# Patient Record
Sex: MALE | Race: WHITE | Employment: FULL TIME | URBAN - METROPOLITAN AREA
[De-identification: names, ages, dates, MRNs, and addresses within clinical notes are randomized per-mention and may not be internally consistent; named-entity substitution may affect disease eponyms.]

---

## 2017-07-21 ENCOUNTER — OFFICE VISIT (OUTPATIENT)
Dept: FAMILY MEDICINE CLINIC | Age: 52
End: 2017-07-21

## 2017-07-21 VITALS
HEART RATE: 64 BPM | BODY MASS INDEX: 25.24 KG/M2 | DIASTOLIC BLOOD PRESSURE: 80 MMHG | OXYGEN SATURATION: 97 % | HEIGHT: 75 IN | WEIGHT: 203 LBS | SYSTOLIC BLOOD PRESSURE: 108 MMHG | RESPIRATION RATE: 14 BRPM

## 2017-07-21 DIAGNOSIS — J45.20 MILD INTERMITTENT ASTHMA WITHOUT COMPLICATION: ICD-10-CM

## 2017-07-21 DIAGNOSIS — J30.9 ALLERGIC RHINITIS, UNSPECIFIED ALLERGIC RHINITIS TRIGGER, UNSPECIFIED RHINITIS SEASONALITY: Primary | ICD-10-CM

## 2017-07-21 DIAGNOSIS — R42 DIZZINESS: ICD-10-CM

## 2017-07-21 PROCEDURE — 99203 OFFICE O/P NEW LOW 30 MIN: CPT | Performed by: FAMILY MEDICINE

## 2017-07-21 RX ORDER — SUMATRIPTAN 25 MG/1
25 TABLET, FILM COATED ORAL
COMMUNITY

## 2017-07-21 RX ORDER — TOPIRAMATE 25 MG/1
25 TABLET ORAL DAILY
COMMUNITY

## 2017-07-21 RX ORDER — CETIRIZINE HYDROCHLORIDE 10 MG/1
10 TABLET ORAL 2 TIMES DAILY
COMMUNITY

## 2017-07-21 RX ORDER — MECLIZINE HYDROCHLORIDE 25 MG/1
25 TABLET ORAL
COMMUNITY
End: 2018-06-14 | Stop reason: SDUPTHER

## 2017-07-21 RX ORDER — ALBUTEROL SULFATE 90 UG/1
2 AEROSOL, METERED RESPIRATORY (INHALATION) EVERY 6 HOURS PRN
COMMUNITY
End: 2017-12-05 | Stop reason: SDUPTHER

## 2017-07-21 RX ORDER — MULTIVIT-MIN/IRON/FOLIC ACID/K 18-600-40
2000 CAPSULE ORAL DAILY
COMMUNITY

## 2017-07-21 ASSESSMENT — ENCOUNTER SYMPTOMS
PHOTOPHOBIA: 0
ABDOMINAL PAIN: 0
EYE ITCHING: 1
NAUSEA: 0
VOMITING: 0

## 2017-07-21 ASSESSMENT — PATIENT HEALTH QUESTIONNAIRE - PHQ9
1. LITTLE INTEREST OR PLEASURE IN DOING THINGS: 0
SUM OF ALL RESPONSES TO PHQ9 QUESTIONS 1 & 2: 0
SUM OF ALL RESPONSES TO PHQ QUESTIONS 1-9: 0
2. FEELING DOWN, DEPRESSED OR HOPELESS: 0

## 2017-07-25 DIAGNOSIS — R42 DIZZINESS: ICD-10-CM

## 2017-07-25 LAB
A/G RATIO: 2.7 (ref 1.1–2.2)
ALBUMIN SERPL-MCNC: 4.9 G/DL (ref 3.4–5)
ALP BLD-CCNC: 55 U/L (ref 40–129)
ALT SERPL-CCNC: 25 U/L (ref 10–40)
ANION GAP SERPL CALCULATED.3IONS-SCNC: 13 MMOL/L (ref 3–16)
AST SERPL-CCNC: 20 U/L (ref 15–37)
BASOPHILS ABSOLUTE: 0 K/UL (ref 0–0.2)
BASOPHILS RELATIVE PERCENT: 0.8 %
BILIRUB SERPL-MCNC: 0.4 MG/DL (ref 0–1)
BUN BLDV-MCNC: 20 MG/DL (ref 7–20)
CALCIUM SERPL-MCNC: 9.4 MG/DL (ref 8.3–10.6)
CHLORIDE BLD-SCNC: 108 MMOL/L (ref 99–110)
CHOLESTEROL, TOTAL: 109 MG/DL (ref 0–199)
CO2: 22 MMOL/L (ref 21–32)
CREAT SERPL-MCNC: 1.5 MG/DL (ref 0.9–1.3)
EOSINOPHILS ABSOLUTE: 0.1 K/UL (ref 0–0.6)
EOSINOPHILS RELATIVE PERCENT: 1.5 %
GFR AFRICAN AMERICAN: 60
GFR NON-AFRICAN AMERICAN: 50
GLOBULIN: 1.8 G/DL
GLUCOSE BLD-MCNC: 96 MG/DL (ref 70–99)
HCT VFR BLD CALC: 43 % (ref 40.5–52.5)
HDLC SERPL-MCNC: 47 MG/DL (ref 40–60)
HEMOGLOBIN: 14.5 G/DL (ref 13.5–17.5)
LDL CHOLESTEROL CALCULATED: 52 MG/DL
LYMPHOCYTES ABSOLUTE: 1.5 K/UL (ref 1–5.1)
LYMPHOCYTES RELATIVE PERCENT: 42.5 %
MAGNESIUM: 2.3 MG/DL (ref 1.8–2.4)
MCH RBC QN AUTO: 32.3 PG (ref 26–34)
MCHC RBC AUTO-ENTMCNC: 33.6 G/DL (ref 31–36)
MCV RBC AUTO: 96 FL (ref 80–100)
MONOCYTES ABSOLUTE: 0.3 K/UL (ref 0–1.3)
MONOCYTES RELATIVE PERCENT: 9 %
NEUTROPHILS ABSOLUTE: 1.7 K/UL (ref 1.7–7.7)
NEUTROPHILS RELATIVE PERCENT: 46.2 %
PDW BLD-RTO: 13.5 % (ref 12.4–15.4)
PLATELET # BLD: 229 K/UL (ref 135–450)
PMV BLD AUTO: 7.8 FL (ref 5–10.5)
POTASSIUM SERPL-SCNC: 4.5 MMOL/L (ref 3.5–5.1)
RBC # BLD: 4.48 M/UL (ref 4.2–5.9)
SODIUM BLD-SCNC: 143 MMOL/L (ref 136–145)
T3 FREE: 2.7 PG/ML (ref 2.3–4.2)
T4 FREE: 1.3 NG/DL (ref 0.9–1.8)
TOTAL PROTEIN: 6.7 G/DL (ref 6.4–8.2)
TRIGL SERPL-MCNC: 49 MG/DL (ref 0–150)
TSH SERPL DL<=0.05 MIU/L-ACNC: 2.07 UIU/ML (ref 0.27–4.2)
VITAMIN D 25-HYDROXY: 36.7 NG/ML
VLDLC SERPL CALC-MCNC: 10 MG/DL
WBC # BLD: 3.6 K/UL (ref 4–11)

## 2017-08-02 PROBLEM — G89.29 CHRONIC BACK PAIN: Status: ACTIVE | Noted: 2017-08-02

## 2017-08-02 PROBLEM — M54.9 CHRONIC BACK PAIN: Status: ACTIVE | Noted: 2017-08-02

## 2017-08-02 PROBLEM — J45.909 ASTHMA: Status: ACTIVE | Noted: 2017-08-02

## 2017-08-02 PROBLEM — R51.9 HEADACHE: Status: ACTIVE | Noted: 2017-08-02

## 2017-08-11 ENCOUNTER — OFFICE VISIT (OUTPATIENT)
Dept: FAMILY MEDICINE CLINIC | Age: 52
End: 2017-08-11

## 2017-08-11 VITALS
RESPIRATION RATE: 14 BRPM | OXYGEN SATURATION: 97 % | DIASTOLIC BLOOD PRESSURE: 82 MMHG | SYSTOLIC BLOOD PRESSURE: 122 MMHG | WEIGHT: 204 LBS | BODY MASS INDEX: 25.36 KG/M2 | HEIGHT: 75 IN | HEART RATE: 78 BPM

## 2017-08-11 DIAGNOSIS — H55.00 NYSTAGMUS: ICD-10-CM

## 2017-08-11 DIAGNOSIS — Z23 NEED FOR PROPHYLACTIC VACCINATION AGAINST DIPHTHERIA-TETANUS-PERTUSSIS (DTP): ICD-10-CM

## 2017-08-11 DIAGNOSIS — M54.50 CHRONIC RIGHT-SIDED LOW BACK PAIN WITHOUT SCIATICA: ICD-10-CM

## 2017-08-11 DIAGNOSIS — G89.29 CHRONIC RIGHT-SIDED LOW BACK PAIN WITHOUT SCIATICA: ICD-10-CM

## 2017-08-11 DIAGNOSIS — R42 DIZZINESS: Primary | ICD-10-CM

## 2017-08-11 PROCEDURE — 90471 IMMUNIZATION ADMIN: CPT | Performed by: FAMILY MEDICINE

## 2017-08-11 PROCEDURE — 99213 OFFICE O/P EST LOW 20 MIN: CPT | Performed by: FAMILY MEDICINE

## 2017-08-11 PROCEDURE — 90715 TDAP VACCINE 7 YRS/> IM: CPT | Performed by: FAMILY MEDICINE

## 2017-08-11 ASSESSMENT — ENCOUNTER SYMPTOMS
VOMITING: 0
NAUSEA: 0
ABDOMINAL PAIN: 0

## 2017-08-29 ENCOUNTER — OFFICE VISIT (OUTPATIENT)
Dept: ORTHOPEDIC SURGERY | Age: 52
End: 2017-08-29

## 2017-08-29 VITALS
BODY MASS INDEX: 26.05 KG/M2 | HEIGHT: 74 IN | SYSTOLIC BLOOD PRESSURE: 136 MMHG | DIASTOLIC BLOOD PRESSURE: 85 MMHG | WEIGHT: 203 LBS

## 2017-08-29 DIAGNOSIS — M47.816 SPONDYLOSIS OF LUMBAR REGION WITHOUT MYELOPATHY OR RADICULOPATHY: ICD-10-CM

## 2017-08-29 DIAGNOSIS — M54.50 PAIN OF LUMBAR SPINE: ICD-10-CM

## 2017-08-29 DIAGNOSIS — M51.36 DDD (DEGENERATIVE DISC DISEASE), LUMBAR: ICD-10-CM

## 2017-08-29 DIAGNOSIS — M51.26 HNP (HERNIATED NUCLEUS PULPOSUS), LUMBAR: Primary | ICD-10-CM

## 2017-08-29 PROCEDURE — 72100 X-RAY EXAM L-S SPINE 2/3 VWS: CPT | Performed by: PHYSICAL MEDICINE & REHABILITATION

## 2017-08-29 PROCEDURE — 99243 OFF/OP CNSLTJ NEW/EST LOW 30: CPT | Performed by: PHYSICAL MEDICINE & REHABILITATION

## 2017-09-01 ENCOUNTER — OFFICE VISIT (OUTPATIENT)
Dept: FAMILY MEDICINE CLINIC | Age: 52
End: 2017-09-01

## 2017-09-01 VITALS
BODY MASS INDEX: 24.99 KG/M2 | HEART RATE: 78 BPM | HEIGHT: 75 IN | RESPIRATION RATE: 14 BRPM | SYSTOLIC BLOOD PRESSURE: 128 MMHG | DIASTOLIC BLOOD PRESSURE: 88 MMHG | OXYGEN SATURATION: 98 % | WEIGHT: 201 LBS

## 2017-09-01 DIAGNOSIS — R42 DIZZINESS: Primary | ICD-10-CM

## 2017-09-01 PROCEDURE — 99213 OFFICE O/P EST LOW 20 MIN: CPT | Performed by: FAMILY MEDICINE

## 2017-09-01 ASSESSMENT — ENCOUNTER SYMPTOMS
ABDOMINAL PAIN: 0
VOMITING: 0
NAUSEA: 0

## 2017-09-12 ENCOUNTER — OFFICE VISIT (OUTPATIENT)
Dept: ORTHOPEDIC SURGERY | Age: 52
End: 2017-09-12

## 2017-09-12 VITALS
SYSTOLIC BLOOD PRESSURE: 136 MMHG | WEIGHT: 201.06 LBS | HEIGHT: 75 IN | BODY MASS INDEX: 25 KG/M2 | DIASTOLIC BLOOD PRESSURE: 85 MMHG

## 2017-09-12 DIAGNOSIS — M51.26 LUMBAR DISC DISPLACEMENT WITHOUT MYELOPATHY: Primary | ICD-10-CM

## 2017-09-12 DIAGNOSIS — M43.16 SPONDYLOLISTHESIS OF LUMBAR REGION: ICD-10-CM

## 2017-09-12 PROCEDURE — 99212 OFFICE O/P EST SF 10 MIN: CPT | Performed by: PHYSICAL MEDICINE & REHABILITATION

## 2017-10-02 ENCOUNTER — OFFICE VISIT (OUTPATIENT)
Dept: ENT CLINIC | Age: 52
End: 2017-10-02

## 2017-10-02 VITALS
SYSTOLIC BLOOD PRESSURE: 131 MMHG | WEIGHT: 206 LBS | DIASTOLIC BLOOD PRESSURE: 89 MMHG | BODY MASS INDEX: 26.44 KG/M2 | HEIGHT: 74 IN

## 2017-10-02 DIAGNOSIS — H81.319 VERTIGO, AURAL, UNSPECIFIED LATERALITY: Primary | ICD-10-CM

## 2017-10-02 DIAGNOSIS — H81.09 ENDOLYMPHATIC HYDROPS, UNSPECIFIED LATERALITY: ICD-10-CM

## 2017-10-02 PROCEDURE — 99243 OFF/OP CNSLTJ NEW/EST LOW 30: CPT | Performed by: OTOLARYNGOLOGY

## 2017-10-02 RX ORDER — TRIAMTERENE AND HYDROCHLOROTHIAZIDE 37.5; 25 MG/1; MG/1
1 TABLET ORAL DAILY
Qty: 30 TABLET | Refills: 3 | Status: SHIPPED | OUTPATIENT
Start: 2017-10-02

## 2017-10-02 NOTE — PROGRESS NOTES
CHIEF COMPLAINT: Vertigo    HISTORY OF PRESENT ILLNESS:  54 y.o. male referred for consultation who presents with 4 years of vertigo. True vertigo. Gets attacks. Occurs irregularly. Lasts 1-3 days. No nausea. No change in hearing. Has tinnitus both ears which preceded vertigo. No ear fullness. No aggravating or alleviating factors. PAST MEDICAL HISTORY:   History   Smoking Status    Former Smoker   Smokeless Tobacco    Current User    Types: Chew                                                    History   Alcohol Use    Yes     Comment: Occassionally                                                    Current Outpatient Prescriptions:     fluticasone-salmeterol (ADVAIR) 250-50 MCG/DOSE AEPB, Inhale 1 puff into the lungs daily, Disp: , Rfl:     topiramate (TOPAMAX) 25 MG tablet, Take 25 mg by mouth daily, Disp: , Rfl:     cetirizine (ZYRTEC) 10 MG tablet, Take 10 mg by mouth 2 times daily, Disp: , Rfl:     SUMAtriptan (IMITREX) 25 MG tablet, Take 25 mg by mouth once as needed for Migraine (PrN), Disp: , Rfl:     meclizine (ANTIVERT) 25 MG tablet, Take 25 mg by mouth once as needed (Prn as needed), Disp: , Rfl:     Cholecalciferol (VITAMIN D) 2000 units CAPS capsule, Take 2,000 Units by mouth daily, Disp: , Rfl:     albuterol sulfate  (90 Base) MCG/ACT inhaler, Inhale 2 puffs into the lungs every 6 hours as needed for Wheezing (Prn), Disp: , Rfl:                                                  Past Medical History:   Diagnosis Date    Allergic rhinitis     Asthma     Chronic back pain     Headache     Osteoarthritis                                                     Past Surgical History:   Procedure Laterality Date    COLONOSCOPY      ELBOW SURGERY Right 07/05/2014    SHOULDER SURGERY Right 08/2015    VASECTOMY           REVIEW OF SYSTEMS:  All pertinent positive and negative review of systems included in HPI. Otherwise, all systems are reviewed and negative.     PHYSICAL

## 2017-12-05 ENCOUNTER — OFFICE VISIT (OUTPATIENT)
Dept: FAMILY MEDICINE CLINIC | Age: 52
End: 2017-12-05

## 2017-12-05 VITALS
HEIGHT: 75 IN | OXYGEN SATURATION: 96 % | SYSTOLIC BLOOD PRESSURE: 128 MMHG | HEART RATE: 112 BPM | WEIGHT: 209 LBS | DIASTOLIC BLOOD PRESSURE: 80 MMHG | BODY MASS INDEX: 25.99 KG/M2 | RESPIRATION RATE: 14 BRPM

## 2017-12-05 DIAGNOSIS — J06.9 BACTERIAL URI: Primary | ICD-10-CM

## 2017-12-05 DIAGNOSIS — B96.89 BACTERIAL URI: Primary | ICD-10-CM

## 2017-12-05 PROCEDURE — 99213 OFFICE O/P EST LOW 20 MIN: CPT | Performed by: FAMILY MEDICINE

## 2017-12-05 RX ORDER — AZITHROMYCIN 250 MG/1
250 TABLET, FILM COATED ORAL DAILY
Qty: 1 PACKET | Refills: 0 | Status: SHIPPED | OUTPATIENT
Start: 2017-12-05 | End: 2018-02-28 | Stop reason: ALTCHOICE

## 2017-12-05 RX ORDER — ALBUTEROL SULFATE 90 UG/1
2 AEROSOL, METERED RESPIRATORY (INHALATION) EVERY 6 HOURS PRN
Qty: 1 INHALER | Refills: 2 | Status: SHIPPED | OUTPATIENT
Start: 2017-12-05 | End: 2020-03-13

## 2017-12-05 RX ORDER — FLUTICASONE PROPIONATE 50 MCG
1 SPRAY, SUSPENSION (ML) NASAL DAILY
Qty: 1 BOTTLE | Refills: 0 | Status: CANCELLED | OUTPATIENT
Start: 2017-12-05

## 2017-12-05 RX ORDER — BENZONATATE 100 MG/1
100 CAPSULE ORAL 3 TIMES DAILY PRN
Qty: 30 CAPSULE | Refills: 1 | Status: SHIPPED | OUTPATIENT
Start: 2017-12-05 | End: 2017-12-15

## 2017-12-05 ASSESSMENT — ENCOUNTER SYMPTOMS
NAUSEA: 0
DIARRHEA: 0
COUGH: 1
VOMITING: 0
RHINORRHEA: 1
CONSTIPATION: 0
ABDOMINAL PAIN: 0
SORE THROAT: 0

## 2017-12-05 NOTE — PROGRESS NOTES
12/5/2017    This is a 50 y.o. male   Chief Complaint   Patient presents with    Dizziness     3 month follow up Vertigo has gone no symptoms for about a 6 weeks    Congestion     nasal drainage going on for about a week     Cough     productive  Using Mucinex and Sudafed sometimes   . HPI  Patient presents today for 3 month follow-up for his dizziness, As well as nasal congestion for the last week and a productive cough. Currently using Nasonex and Sudafed. Saw ENT who thought it might be fluid around inner ear, was prescribed a medicine that helped except that pt got body cramps. Has appointment with ENT tomorrow. Dizziness has resolved. Past Medical History:   Diagnosis Date    Allergic rhinitis     Asthma     Chronic back pain     Headache     Osteoarthritis        Past Surgical History:   Procedure Laterality Date    COLONOSCOPY      ELBOW SURGERY Right 07/05/2014    SHOULDER SURGERY Right 08/2015    VASECTOMY         Social History     Social History    Marital status:      Spouse name: N/A    Number of children: N/A    Years of education: N/A     Occupational History    Not on file. Social History Main Topics    Smoking status: Former Smoker    Smokeless tobacco: Former User     Types: Chew    Alcohol use Yes      Comment: Occassionally    Drug use: No    Sexual activity: Yes     Partners: Female     Other Topics Concern    Not on file     Social History Narrative    No narrative on file       History reviewed. No pertinent family history.     Current Outpatient Prescriptions   Medication Sig Dispense Refill    albuterol sulfate  (90 Base) MCG/ACT inhaler Inhale 2 puffs into the lungs every 6 hours as needed for Wheezing (Prn) 1 Inhaler 2    azithromycin (ZITHROMAX Z-LIZ) 250 MG tablet Take 1 tablet by mouth daily As directed on pack 1 packet 0    benzonatate (TESSALON PERLES) 100 MG capsule Take 1 capsule by mouth 3 times daily as needed for Cough 30 capsule 1    fluticasone-salmeterol (ADVAIR) 250-50 MCG/DOSE AEPB Inhale 1 puff into the lungs daily      cetirizine (ZYRTEC) 10 MG tablet Take 10 mg by mouth 2 times daily      SUMAtriptan (IMITREX) 25 MG tablet Take 25 mg by mouth once as needed for Migraine (PrN)      meclizine (ANTIVERT) 25 MG tablet Take 25 mg by mouth once as needed (Prn as needed)      Cholecalciferol (VITAMIN D) 2000 units CAPS capsule Take 2,000 Units by mouth daily      triamterene-hydrochlorothiazide (MAXZIDE-25) 37.5-25 MG per tablet Take 1 tablet by mouth daily 30 tablet 3    topiramate (TOPAMAX) 25 MG tablet Take 25 mg by mouth daily       No current facility-administered medications for this visit.         Immunization History   Administered Date(s) Administered    Tdap (Boostrix, Adacel) 08/11/2017       Allergies   Allergen Reactions    Sulfa Antibiotics Shortness Of Breath    Sulphadimidine [Sulfamethazine] Shortness Of Breath       Orders Only on 07/25/2017   Component Date Value Ref Range Status    TSH 07/25/2017 2.07  0.27 - 4.20 uIU/mL Final    T4 Free 07/25/2017 1.3  0.9 - 1.8 ng/dL Final    T3, Free 07/25/2017 2.7  2.3 - 4.2 pg/mL Final    Cholesterol, Total 07/25/2017 109  0 - 199 mg/dL Final    Triglycerides 07/25/2017 49  0 - 150 mg/dL Final    HDL 07/25/2017 47  40 - 60 mg/dL Final    LDL Calculated 07/25/2017 52  <100 mg/dL Final    VLDL CHOLESTEROL CALCULATED 07/25/2017 10  Not Established mg/dL Final    WBC 07/25/2017 3.6* 4.0 - 11.0 K/uL Final    RBC 07/25/2017 4.48  4.20 - 5.90 M/uL Final    Hemoglobin 07/25/2017 14.5  13.5 - 17.5 g/dL Final    Hematocrit 07/25/2017 43.0  40.5 - 52.5 % Final    MCV 07/25/2017 96.0  80.0 - 100.0 fL Final    MCH 07/25/2017 32.3  26.0 - 34.0 pg Final    MCHC 07/25/2017 33.6  31.0 - 36.0 g/dL Final    RDW 07/25/2017 13.5  12.4 - 15.4 % Final    Platelets 03/66/6056 229  135 - 450 K/uL Final    MPV 07/25/2017 7.8  5.0 - 10.5 fL Final    Neutrophils % 07/25/2017 46.2  % Final    Lymphocytes % 07/25/2017 42.5  % Final    Monocytes % 07/25/2017 9.0  % Final    Eosinophils % 07/25/2017 1.5  % Final    Basophils % 07/25/2017 0.8  % Final    Neutrophils # 07/25/2017 1.7  1.7 - 7.7 K/uL Final    Lymphocytes # 07/25/2017 1.5  1.0 - 5.1 K/uL Final    Monocytes # 07/25/2017 0.3  0.0 - 1.3 K/uL Final    Eosinophils # 07/25/2017 0.1  0.0 - 0.6 K/uL Final    Basophils # 07/25/2017 0.0  0.0 - 0.2 K/uL Final    Sodium 07/25/2017 143  136 - 145 mmol/L Final    Potassium 07/25/2017 4.5  3.5 - 5.1 mmol/L Final    Chloride 07/25/2017 108  99 - 110 mmol/L Final    CO2 07/25/2017 22  21 - 32 mmol/L Final    Anion Gap 07/25/2017 13  3 - 16 Final    Glucose 07/25/2017 96  70 - 99 mg/dL Final    BUN 07/25/2017 20  7 - 20 mg/dL Final    CREATININE 07/25/2017 1.5* 0.9 - 1.3 mg/dL Final    GFR Non- 07/25/2017 50* >60 Final    Comment: >60 mL/min/1.73m2 EGFR, calc. for ages 25 and older using the  MDRD formula (not corrected for weight), is valid for stable  renal function.  GFR  07/25/2017 60* >60 Final    Comment: Chronic Kidney Disease: less than 60 ml/min/1.73 sq.m. Kidney Failure: less than 15 ml/min/1.73 sq.m. Results valid for patients 18 years and older.  Calcium 07/25/2017 9.4  8.3 - 10.6 mg/dL Final    Total Protein 07/25/2017 6.7  6.4 - 8.2 g/dL Final    Alb 07/25/2017 4.9  3.4 - 5.0 g/dL Final    Albumin/Globulin Ratio 07/25/2017 2.7* 1.1 - 2.2 Final    Total Bilirubin 07/25/2017 0.4  0.0 - 1.0 mg/dL Final    Alkaline Phosphatase 07/25/2017 55  40 - 129 U/L Final    ALT 07/25/2017 25  10 - 40 U/L Final    AST 07/25/2017 20  15 - 37 U/L Final    Globulin 07/25/2017 1.8  g/dL Final    Vit D, 25-Hydroxy 07/25/2017 36.7  >=30 ng/mL Final    Comment: <=20 ng/mL. ........... Sammie Prows Deficient  21-29 ng/mL. ......... Sammie Prows Insufficient  >=30 ng/mL. ........ Sammie Prows Sufficient      Magnesium 07/25/2017 2.30  1.80 - 2.40 mg/dL Final       Review of Systems   Constitutional: Positive for fatigue. Negative for chills and fever. HENT: Positive for congestion, postnasal drip and rhinorrhea. Negative for ear pain, hearing loss and sore throat (intially but has resolved). Respiratory: Positive for cough (productive - creamy green). Gastrointestinal: Negative for abdominal pain, constipation, diarrhea, nausea and vomiting. Neurological: Negative for dizziness. /80 (Site: Left Arm, Position: Sitting, Cuff Size: Medium Adult)   Pulse 112   Resp 14   Ht 6' 3\" (1.905 m)   Wt 209 lb (94.8 kg)   SpO2 96%   BMI 26.12 kg/m²     Physical Exam   Constitutional: He is oriented to person, place, and time. He appears well-developed and well-nourished. HENT:   Head: Normocephalic and atraumatic. Right Ear: External ear normal.   Left Ear: External ear normal.   Bilateral turbinate edema and erythema, mod pharyngeal erythema   Eyes: EOM are normal. Pupils are equal, round, and reactive to light. Neck: Normal range of motion. Cardiovascular: Normal rate, regular rhythm and normal heart sounds. Pulmonary/Chest: Effort normal. He has no rales. Decreased breath sounds bilaterally   Abdominal: Soft. Bowel sounds are normal. There is no tenderness. Neurological: He is alert and oriented to person, place, and time. Skin: Skin is warm and dry. Psychiatric: He has a normal mood and affect. His behavior is normal. Judgment and thought content normal.       Plan  1. Bacterial URI  albuterol sulfate  (90 Base) MCG/ACT inhaler    azithromycin (ZITHROMAX Z-LIZ) 250 MG tablet    benzonatate (TESSALON PERLES) 100 MG capsule   Pt states that Flonase has given him sinus infections in the past.    Return in about 3 months (around 3/5/2018) for Dizziness. Prior to Visit Medications    Medication Sig Taking?  Authorizing Provider   albuterol sulfate  (90 Base) MCG/ACT inhaler Inhale 2 puffs into the lungs every 6 hours as needed for Wheezing (Prn) Yes Jac Reyes DO   azithromycin (ZITHROMAX Z-LIZ) 250 MG tablet Take 1 tablet by mouth daily As directed on pack Yes Jac Reyes DO   benzonatate (TESSALON PERLES) 100 MG capsule Take 1 capsule by mouth 3 times daily as needed for Cough Yes Jac Reyes DO   fluticasone-salmeterol (ADVAIR) 250-50 MCG/DOSE AEPB Inhale 1 puff into the lungs daily Yes Historical Provider, MD   cetirizine (ZYRTEC) 10 MG tablet Take 10 mg by mouth 2 times daily Yes Historical Provider, MD   SUMAtriptan (IMITREX) 25 MG tablet Take 25 mg by mouth once as needed for Migraine (PrN) Yes Historical Provider, MD   meclizine (ANTIVERT) 25 MG tablet Take 25 mg by mouth once as needed (Prn as needed) Yes Historical Provider, MD   Cholecalciferol (VITAMIN D) 2000 units CAPS capsule Take 2,000 Units by mouth daily Yes Historical Provider, MD   triamterene-hydrochlorothiazide (MAXZIDE-25) 37.5-25 MG per tablet Take 1 tablet by mouth daily  Ingrid Khan MD   topiramate (TOPAMAX) 25 MG tablet Take 25 mg by mouth daily  Historical Provider, MD

## 2017-12-06 ENCOUNTER — OFFICE VISIT (OUTPATIENT)
Dept: ENT CLINIC | Age: 52
End: 2017-12-06

## 2017-12-06 VITALS
SYSTOLIC BLOOD PRESSURE: 136 MMHG | TEMPERATURE: 98.1 F | HEIGHT: 76 IN | BODY MASS INDEX: 25.82 KG/M2 | WEIGHT: 212 LBS | DIASTOLIC BLOOD PRESSURE: 88 MMHG | HEART RATE: 96 BPM

## 2017-12-06 DIAGNOSIS — H81.09 ENDOLYMPHATIC HYDROPS, UNSPECIFIED LATERALITY: ICD-10-CM

## 2017-12-06 DIAGNOSIS — H81.319 VERTIGO, AURAL, UNSPECIFIED LATERALITY: Primary | ICD-10-CM

## 2017-12-06 PROCEDURE — 99212 OFFICE O/P EST SF 10 MIN: CPT | Performed by: OTOLARYNGOLOGY

## 2017-12-06 NOTE — PROGRESS NOTES
FOLLOW UP VISIT:      INTERIM HISTORY: Seen 1 month ago with vertigo, which sounded like Ménière's disease. Treated with oral diuretic. Has some cramping after 1 month so he stopped.   However, vertigo, ear fullness have completely resolved and has not returned even though she stopped the medications      PAST MEDICAL HISTORY:   History   Smoking Status    Former Smoker   Smokeless Tobacco    Former User    Types: Chew                                                    History   Alcohol Use    Yes     Comment: Occassionally                                                    Current Outpatient Prescriptions:     albuterol sulfate  (90 Base) MCG/ACT inhaler, Inhale 2 puffs into the lungs every 6 hours as needed for Wheezing (Prn), Disp: 1 Inhaler, Rfl: 2    azithromycin (ZITHROMAX Z-LIZ) 250 MG tablet, Take 1 tablet by mouth daily As directed on pack, Disp: 1 packet, Rfl: 0    benzonatate (TESSALON PERLES) 100 MG capsule, Take 1 capsule by mouth 3 times daily as needed for Cough, Disp: 30 capsule, Rfl: 1    triamterene-hydrochlorothiazide (MAXZIDE-25) 37.5-25 MG per tablet, Take 1 tablet by mouth daily, Disp: 30 tablet, Rfl: 3    fluticasone-salmeterol (ADVAIR) 250-50 MCG/DOSE AEPB, Inhale 1 puff into the lungs daily, Disp: , Rfl:     topiramate (TOPAMAX) 25 MG tablet, Take 25 mg by mouth daily, Disp: , Rfl:     cetirizine (ZYRTEC) 10 MG tablet, Take 10 mg by mouth 2 times daily, Disp: , Rfl:     SUMAtriptan (IMITREX) 25 MG tablet, Take 25 mg by mouth once as needed for Migraine (PrN), Disp: , Rfl:     meclizine (ANTIVERT) 25 MG tablet, Take 25 mg by mouth once as needed (Prn as needed), Disp: , Rfl:     Cholecalciferol (VITAMIN D) 2000 units CAPS capsule, Take 2,000 Units by mouth daily, Disp: , Rfl:                                                  Past Medical History:   Diagnosis Date    Allergic rhinitis     Asthma     Chronic back pain     Headache     Osteoarthritis Past Surgical History:   Procedure Laterality Date    COLONOSCOPY      ELBOW SURGERY Right 07/05/2014    SHOULDER SURGERY Right 08/2015    VASECTOMY           REVIEW OF SYSTEMS:  Al pertinent positive and negative findings included in HPI. Otherwise, all other systems are reviewed and are negative    PHYSICAL EXAMINATION:   GENERAL: [id number]- no acute distress  COMMUNICATION :  Normal voice  MENTAL STATUS:  Normal  HEAD AND FACE:  Normal  EXTERNAL EARS AND NOSE:  Normal  FACIAL MUSCLES:  Normal  OTOSCOPY:  Normal tympanic membranes and middle ear spaces  TUNING FORKS:  Rinne ++ Hernandez midline at 512 Hz      IMPRESSION: Resolved vertigo    PLAN: If vertigo symptoms recur, patient will need to take one half pill daily or take 1 pill every other day.       FOLLOW-UP: As needed

## 2018-02-28 ENCOUNTER — OFFICE VISIT (OUTPATIENT)
Dept: FAMILY MEDICINE CLINIC | Age: 53
End: 2018-02-28

## 2018-02-28 VITALS
BODY MASS INDEX: 26.36 KG/M2 | SYSTOLIC BLOOD PRESSURE: 122 MMHG | OXYGEN SATURATION: 93 % | DIASTOLIC BLOOD PRESSURE: 72 MMHG | HEART RATE: 92 BPM | HEIGHT: 75 IN | RESPIRATION RATE: 16 BRPM | WEIGHT: 212 LBS

## 2018-02-28 DIAGNOSIS — R42 DIZZINESS: Primary | ICD-10-CM

## 2018-02-28 DIAGNOSIS — Z12.5 PROSTATE CANCER SCREENING: ICD-10-CM

## 2018-02-28 PROCEDURE — 99213 OFFICE O/P EST LOW 20 MIN: CPT | Performed by: FAMILY MEDICINE

## 2018-02-28 ASSESSMENT — ENCOUNTER SYMPTOMS: BACK PAIN: 1

## 2018-02-28 NOTE — PROGRESS NOTES
Basophils # 07/25/2017 0.0  0.0 - 0.2 K/uL Final    Sodium 07/25/2017 143  136 - 145 mmol/L Final    Potassium 07/25/2017 4.5  3.5 - 5.1 mmol/L Final    Chloride 07/25/2017 108  99 - 110 mmol/L Final    CO2 07/25/2017 22  21 - 32 mmol/L Final    Anion Gap 07/25/2017 13  3 - 16 Final    Glucose 07/25/2017 96  70 - 99 mg/dL Final    BUN 07/25/2017 20  7 - 20 mg/dL Final    CREATININE 07/25/2017 1.5* 0.9 - 1.3 mg/dL Final    GFR Non- 07/25/2017 50* >60 Final    Comment: >60 mL/min/1.73m2 EGFR, calc. for ages 25 and older using the  MDRD formula (not corrected for weight), is valid for stable  renal function.  GFR  07/25/2017 60* >60 Final    Comment: Chronic Kidney Disease: less than 60 ml/min/1.73 sq.m. Kidney Failure: less than 15 ml/min/1.73 sq.m. Results valid for patients 18 years and older.  Calcium 07/25/2017 9.4  8.3 - 10.6 mg/dL Final    Total Protein 07/25/2017 6.7  6.4 - 8.2 g/dL Final    Alb 07/25/2017 4.9  3.4 - 5.0 g/dL Final    Albumin/Globulin Ratio 07/25/2017 2.7* 1.1 - 2.2 Final    Total Bilirubin 07/25/2017 0.4  0.0 - 1.0 mg/dL Final    Alkaline Phosphatase 07/25/2017 55  40 - 129 U/L Final    ALT 07/25/2017 25  10 - 40 U/L Final    AST 07/25/2017 20  15 - 37 U/L Final    Globulin 07/25/2017 1.8  g/dL Final    Vit D, 25-Hydroxy 07/25/2017 36.7  >=30 ng/mL Final    Comment: <=20 ng/mL. ........... Debbi Shanita Deficient  21-29 ng/mL. ......... Debbi Shanita Insufficient  >=30 ng/mL. ........ Debbi Shanita Sufficient      Magnesium 07/25/2017 2.30  1.80 - 2.40 mg/dL Final       Review of Systems   Musculoskeletal: Positive for back pain. Neurological: Negative for dizziness (resolved). /72 (Site: Left Arm, Position: Sitting, Cuff Size: Medium Adult)   Pulse 92   Resp 16   Ht 6' 3\" (1.905 m)   Wt 212 lb (96.2 kg)   SpO2 93%   BMI 26.50 kg/m²     Physical Exam   Constitutional: He is oriented to person, place, and time.  He appears well-developed and well-nourished. HENT:   Head: Normocephalic and atraumatic. Eyes: EOM are normal. Pupils are equal, round, and reactive to light. Neck: Normal range of motion. Cardiovascular: Normal rate, regular rhythm and normal heart sounds. Pulmonary/Chest: Effort normal and breath sounds normal.   Abdominal: Bowel sounds are normal.   Neurological: He is alert and oriented to person, place, and time. Psychiatric: He has a normal mood and affect. His behavior is normal. Judgment and thought content normal.       Plan  1. Dizziness  TSH without Reflex    Lipid Panel    CBC Auto Differential    Comprehensive Metabolic Panel    Vitamin D 25 Hydroxy    Magnesium   2. Prostate cancer screening  Psa screening       Return in about 6 months (around 8/28/2018) for Physical Exam.    Prior to Visit Medications    Medication Sig Taking?  Authorizing Provider   albuterol sulfate  (90 Base) MCG/ACT inhaler Inhale 2 puffs into the lungs every 6 hours as needed for Wheezing (Prn) Yes Christian Chang DO   triamterene-hydrochlorothiazide (MAXZIDE-25) 37.5-25 MG per tablet Take 1 tablet by mouth daily  Patient taking differently: Take 1 tablet by mouth daily  Yes Tonia Varghese MD   fluticasone-salmeterol (ADVAIR) 250-50 MCG/DOSE AEPB Inhale 1 puff into the lungs daily Yes Historical Provider, MD   topiramate (TOPAMAX) 25 MG tablet Take 25 mg by mouth daily Yes Historical Provider, MD   cetirizine (ZYRTEC) 10 MG tablet Take 10 mg by mouth 2 times daily Yes Historical Provider, MD   SUMAtriptan (IMITREX) 25 MG tablet Take 25 mg by mouth once as needed for Migraine (PrN) Yes Historical Provider, MD   meclizine (ANTIVERT) 25 MG tablet Take 25 mg by mouth once as needed (Prn as needed) Yes Historical Provider, MD   Cholecalciferol (VITAMIN D) 2000 units CAPS capsule Take 2,000 Units by mouth daily Yes Historical Provider, MD

## 2018-04-03 ENCOUNTER — APPOINTMENT (RX ONLY)
Dept: URBAN - METROPOLITAN AREA CLINIC 88 | Facility: CLINIC | Age: 53
Setting detail: DERMATOLOGY
End: 2018-04-03

## 2018-04-03 PROBLEM — L55.1 SUNBURN OF SECOND DEGREE: Status: ACTIVE | Noted: 2018-04-03

## 2018-04-03 PROBLEM — L85.3 XEROSIS CUTIS: Status: ACTIVE | Noted: 2018-04-03

## 2018-04-03 PROBLEM — E13.9 OTHER SPECIFIED DIABETES MELLITUS WITHOUT COMPLICATIONS: Status: ACTIVE | Noted: 2018-04-03

## 2018-04-03 PROBLEM — D48.5 NEOPLASM OF UNCERTAIN BEHAVIOR OF SKIN: Status: ACTIVE | Noted: 2018-04-03

## 2018-04-03 PROBLEM — C44.219 BASAL CELL CARCINOMA OF SKIN OF LEFT EAR AND EXTERNAL AURICULAR CANAL: Status: ACTIVE | Noted: 2018-04-03

## 2018-04-03 PROCEDURE — ? CURETTAGE AND DESTRUCTION

## 2018-04-03 PROCEDURE — 17281 DSTR MAL LS F/E/E/N/L/M .6-1: CPT

## 2018-04-03 PROCEDURE — ? BIOPSY BY SHAVE METHOD

## 2018-04-03 PROCEDURE — 11100: CPT | Mod: 59

## 2018-04-03 NOTE — PROCEDURE: BIOPSY BY SHAVE METHOD
Size Of Lesion In Cm: 0.7
Bill For Surgical Tray: no
Biopsy Type: H and E
Lab: [id number]
Dressing: bandage
Hemostasis: Drysol
Consent: Written consent was obtained and risks were reviewed including but not limited to scarring, infection, bleeding, scabbing, incomplete removal, nerve damage and allergy to anesthesia.
Biopsy Method: Dermablade
Electrodesiccation And Curettage Text: The wound bed was treated with electrodesiccation and curettage after the biopsy was performed.
Billing Type: Third-Party Bill
Additional Anesthesia Volume In Cc (Will Not Render If 0): 0
Detail Level: Detailed
Electrodesiccation Text: The wound bed was treated with electrodesiccation after the biopsy was performed.
Cryotherapy Text: The wound bed was treated with cryotherapy after the biopsy was performed.
Notification Instructions: Patient will be notified of biopsy results. However, patient instructed to call the office if not contacted within 2 weeks.
Anesthesia Type: 1% lidocaine with epinephrine
Type Of Destruction Used: Curettage
Silver Nitrate Text: The wound bed was treated with silver nitrate after the biopsy was performed.
Lab Facility: [id number]
Wound Care: Petrolatum
Post-Care Instructions: I reviewed with the patient in detail post-care instructions. Patient is to keep the biopsy site dry overnight, and then apply bacitracin twice daily until healed. Patient may apply hydrogen peroxide soaks to remove any crusting.
Anesthesia Volume In Cc (Will Not Render If 0): 0.5
Additional Anticipated Plans: If malignant consider excision
Curettage Text: The wound bed was treated with curettage after the biopsy was performed.

## 2018-04-03 NOTE — PROCEDURE: CURETTAGE AND DESTRUCTION
Detail Level: Detailed
What Was Performed First?: Curettage
Consent was obtained from the patient. The risks, benefits and alternatives to therapy were discussed in detail. Specifically, the risks of infection, scarring, bleeding, prolonged wound healing, nerve injury, incomplete removal, allergy to anesthesia and recurrence were addressed. Alternatives to ED&C, such as: surgical removal and XRT were also discussed.  Prior to the procedure, the treatment site was clearly identified and confirmed by the patient. All components of Universal Protocol/PAUSE Rule completed.
Render Post-Care Instructions In Note?: yes
Bill As A Line Item Or As Units: Line Item
Add Ability To Document Additional Intralesional Injection: No
Cautery Type: electrodesiccation
Number Of Curettages: 3
Anesthesia Type: 1% lidocaine with epinephrine
Size Of Lesion In Cm: 0.4
Additional Information: (Optional): The wound was cleaned, and a pressure dressing was applied.  The patient received detailed post-op instructions.
Total Volume (Ccs): 1
Post-Care Instructions: I reviewed with the patient in detail post-care instructions. Patient is to keep the area dry for 48 hours, and not to engage in any swimming until the area is healed. Should the patient develop any fevers, chills, bleeding, severe pain patient will contact the office immediately.
Size Of Lesion After Curettage: 0.9

## 2018-06-05 DIAGNOSIS — Z12.11 COLON CANCER SCREENING: Primary | ICD-10-CM

## 2018-06-12 ENCOUNTER — APPOINTMENT (RX ONLY)
Dept: URBAN - METROPOLITAN AREA CLINIC 88 | Facility: CLINIC | Age: 53
Setting detail: DERMATOLOGY
End: 2018-06-12

## 2018-06-12 DIAGNOSIS — Z85.828 PERSONAL HISTORY OF OTHER MALIGNANT NEOPLASM OF SKIN: ICD-10-CM

## 2018-06-12 PROCEDURE — ? COUNSELING

## 2018-06-12 ASSESSMENT — LOCATION SIMPLE DESCRIPTION DERM: LOCATION SIMPLE: LEFT EAR

## 2018-06-12 ASSESSMENT — LOCATION ZONE DERM: LOCATION ZONE: EAR

## 2018-06-12 ASSESSMENT — LOCATION DETAILED DESCRIPTION DERM: LOCATION DETAILED: LEFT CAVUM CONCHA

## 2018-06-14 RX ORDER — MECLIZINE HYDROCHLORIDE 25 MG/1
25 TABLET ORAL
Qty: 30 TABLET | Refills: 0 | Status: SHIPPED | OUTPATIENT
Start: 2018-06-14 | End: 2018-06-14

## 2018-08-31 ENCOUNTER — OFFICE VISIT (OUTPATIENT)
Dept: FAMILY MEDICINE CLINIC | Age: 53
End: 2018-08-31

## 2018-08-31 VITALS
HEART RATE: 104 BPM | WEIGHT: 203 LBS | OXYGEN SATURATION: 98 % | DIASTOLIC BLOOD PRESSURE: 84 MMHG | HEIGHT: 74 IN | SYSTOLIC BLOOD PRESSURE: 122 MMHG | BODY MASS INDEX: 26.05 KG/M2

## 2018-08-31 DIAGNOSIS — Z12.12 SCREENING FOR COLORECTAL CANCER: ICD-10-CM

## 2018-08-31 DIAGNOSIS — B35.1 ONYCHOMYCOSIS: ICD-10-CM

## 2018-08-31 DIAGNOSIS — Z00.00 ANNUAL PHYSICAL EXAM: Primary | ICD-10-CM

## 2018-08-31 DIAGNOSIS — Z12.11 SCREENING FOR COLORECTAL CANCER: ICD-10-CM

## 2018-08-31 PROCEDURE — 90471 IMMUNIZATION ADMIN: CPT | Performed by: FAMILY MEDICINE

## 2018-08-31 PROCEDURE — 90732 PPSV23 VACC 2 YRS+ SUBQ/IM: CPT | Performed by: FAMILY MEDICINE

## 2018-08-31 PROCEDURE — 99396 PREV VISIT EST AGE 40-64: CPT | Performed by: FAMILY MEDICINE

## 2018-08-31 ASSESSMENT — ENCOUNTER SYMPTOMS
BACK PAIN: 0
COUGH: 0
SHORTNESS OF BREATH: 0
ABDOMINAL PAIN: 0
RHINORRHEA: 1
DIARRHEA: 0
EYE PAIN: 0
COLOR CHANGE: 0
EYE ITCHING: 1
NAUSEA: 0
VOMITING: 0
SORE THROAT: 0
APNEA: 0

## 2018-08-31 ASSESSMENT — PATIENT HEALTH QUESTIONNAIRE - PHQ9
2. FEELING DOWN, DEPRESSED OR HOPELESS: 0
SUM OF ALL RESPONSES TO PHQ QUESTIONS 1-9: 0
SUM OF ALL RESPONSES TO PHQ QUESTIONS 1-9: 0
1. LITTLE INTEREST OR PLEASURE IN DOING THINGS: 0
SUM OF ALL RESPONSES TO PHQ9 QUESTIONS 1 & 2: 0

## 2018-09-05 DIAGNOSIS — Z00.00 ANNUAL PHYSICAL EXAM: ICD-10-CM

## 2018-09-05 DIAGNOSIS — B35.1 ONYCHOMYCOSIS: ICD-10-CM

## 2018-09-05 LAB
A/G RATIO: 2.1 (ref 1.1–2.2)
ALBUMIN SERPL-MCNC: 4.7 G/DL (ref 3.4–5)
ALP BLD-CCNC: 75 U/L (ref 40–129)
ALT SERPL-CCNC: 17 U/L (ref 10–40)
ANION GAP SERPL CALCULATED.3IONS-SCNC: 13 MMOL/L (ref 3–16)
AST SERPL-CCNC: 18 U/L (ref 15–37)
BASOPHILS ABSOLUTE: 0 K/UL (ref 0–0.2)
BASOPHILS RELATIVE PERCENT: 0.9 %
BILIRUB SERPL-MCNC: 0.5 MG/DL (ref 0–1)
BUN BLDV-MCNC: 16 MG/DL (ref 7–20)
CALCIUM SERPL-MCNC: 9.5 MG/DL (ref 8.3–10.6)
CHLORIDE BLD-SCNC: 105 MMOL/L (ref 99–110)
CHOLESTEROL, TOTAL: 120 MG/DL (ref 0–199)
CO2: 24 MMOL/L (ref 21–32)
CREAT SERPL-MCNC: 1.5 MG/DL (ref 0.9–1.3)
EOSINOPHILS ABSOLUTE: 0.1 K/UL (ref 0–0.6)
EOSINOPHILS RELATIVE PERCENT: 2.1 %
GFR AFRICAN AMERICAN: 60
GFR NON-AFRICAN AMERICAN: 49
GLOBULIN: 2.2 G/DL
GLUCOSE BLD-MCNC: 73 MG/DL (ref 70–99)
HCT VFR BLD CALC: 41.9 % (ref 40.5–52.5)
HDLC SERPL-MCNC: 45 MG/DL (ref 40–60)
HEMOGLOBIN: 14.1 G/DL (ref 13.5–17.5)
LDL CHOLESTEROL CALCULATED: 64 MG/DL
LYMPHOCYTES ABSOLUTE: 1.4 K/UL (ref 1–5.1)
LYMPHOCYTES RELATIVE PERCENT: 40.4 %
MAGNESIUM: 2.3 MG/DL (ref 1.8–2.4)
MCH RBC QN AUTO: 31.2 PG (ref 26–34)
MCHC RBC AUTO-ENTMCNC: 33.7 G/DL (ref 31–36)
MCV RBC AUTO: 92.4 FL (ref 80–100)
MONOCYTES ABSOLUTE: 0.4 K/UL (ref 0–1.3)
MONOCYTES RELATIVE PERCENT: 10.3 %
NEUTROPHILS ABSOLUTE: 1.6 K/UL (ref 1.7–7.7)
NEUTROPHILS RELATIVE PERCENT: 46.3 %
PDW BLD-RTO: 13.9 % (ref 12.4–15.4)
PLATELET # BLD: 236 K/UL (ref 135–450)
PMV BLD AUTO: 7.4 FL (ref 5–10.5)
POTASSIUM SERPL-SCNC: 4.7 MMOL/L (ref 3.5–5.1)
PROSTATE SPECIFIC ANTIGEN: 0.24 NG/ML (ref 0–4)
RBC # BLD: 4.53 M/UL (ref 4.2–5.9)
SODIUM BLD-SCNC: 142 MMOL/L (ref 136–145)
TOTAL PROTEIN: 6.9 G/DL (ref 6.4–8.2)
TRIGL SERPL-MCNC: 57 MG/DL (ref 0–150)
TSH SERPL DL<=0.05 MIU/L-ACNC: 1.78 UIU/ML (ref 0.27–4.2)
VITAMIN D 25-HYDROXY: 50.8 NG/ML
VLDLC SERPL CALC-MCNC: 11 MG/DL
WBC # BLD: 3.4 K/UL (ref 4–11)

## 2018-09-28 DIAGNOSIS — R94.4 DECREASED GFR: Primary | ICD-10-CM

## 2018-09-28 DIAGNOSIS — D70.9 NEUTROPENIA, UNSPECIFIED TYPE (HCC): ICD-10-CM

## 2018-10-29 ENCOUNTER — HOSPITAL ENCOUNTER (OUTPATIENT)
Age: 53
Discharge: HOME | End: 2018-10-29
Payer: COMMERCIAL

## 2018-10-29 LAB
24HR URINE VOLUME (ML): 1325 ML
ANION GAP SERPL CALCULATED.3IONS-SCNC: 16 MMOL/L (ref 3–16)
BUN BLDV-MCNC: 15 MG/DL (ref 7–20)
CALCIUM SERPL-MCNC: 9.6 MG/DL (ref 8.3–10.6)
CHLORIDE BLD-SCNC: 104 MMOL/L (ref 99–110)
CO2: 23 MMOL/L (ref 21–32)
CREAT SERPL-MCNC: 1.5 MG/DL (ref 0.8–1.3)
CREAT SERPL-MCNC: 1.5 MG/DL (ref 0.9–1.3)
CREATININE 24 HOUR URINE: 2.4 G/24HR (ref 0.6–2.5)
CREATININE CLEARANCE: 86 ML/MIN (ref 100–140)
GFR AFRICAN AMERICAN: 60
GFR NON-AFRICAN AMERICAN: 49
GLUCOSE BLD-MCNC: 88 MG/DL (ref 70–99)
Lab: 24 HR
PARATHYROID HORMONE INTACT: 47.5 PG/ML (ref 14–72)
POTASSIUM SERPL-SCNC: 4.7 MMOL/L (ref 3.5–5.1)
PROTEIN 24 HOUR URINE: 0.12 G/24HR
SODIUM BLD-SCNC: 143 MMOL/L (ref 136–145)
VITAMIN D 25-HYDROXY: 48.1 NG/ML

## 2018-10-29 PROCEDURE — 82575 CREATININE CLEARANCE TEST: CPT

## 2018-10-29 PROCEDURE — 80048 BASIC METABOLIC PNL TOTAL CA: CPT

## 2018-10-29 PROCEDURE — 84156 ASSAY OF PROTEIN URINE: CPT

## 2018-10-29 PROCEDURE — 83970 ASSAY OF PARATHORMONE: CPT

## 2018-10-29 PROCEDURE — 36415 COLL VENOUS BLD VENIPUNCTURE: CPT

## 2018-10-29 PROCEDURE — 82306 VITAMIN D 25 HYDROXY: CPT

## 2019-01-07 ENCOUNTER — APPOINTMENT (RX ONLY)
Dept: URBAN - METROPOLITAN AREA CLINIC 88 | Facility: CLINIC | Age: 54
Setting detail: DERMATOLOGY
End: 2019-01-07

## 2019-01-07 DIAGNOSIS — D22 MELANOCYTIC NEVI: ICD-10-CM

## 2019-01-07 DIAGNOSIS — L91.8 OTHER HYPERTROPHIC DISORDERS OF THE SKIN: ICD-10-CM

## 2019-01-07 PROBLEM — D22.4 MELANOCYTIC NEVI OF SCALP AND NECK: Status: ACTIVE | Noted: 2019-01-07

## 2019-01-07 PROCEDURE — 99213 OFFICE O/P EST LOW 20 MIN: CPT

## 2019-01-07 PROCEDURE — ? COUNSELING

## 2019-01-07 ASSESSMENT — LOCATION SIMPLE DESCRIPTION DERM
LOCATION SIMPLE: LEFT EAR
LOCATION SIMPLE: SCALP

## 2019-01-07 ASSESSMENT — LOCATION DETAILED DESCRIPTION DERM
LOCATION DETAILED: LEFT POSTERIOR EAR
LOCATION DETAILED: LEFT INFERIOR POSTAURICULAR SKIN

## 2019-01-07 ASSESSMENT — LOCATION ZONE DERM
LOCATION ZONE: EAR
LOCATION ZONE: SCALP

## 2019-09-10 ENCOUNTER — OFFICE VISIT (OUTPATIENT)
Dept: FAMILY MEDICINE CLINIC | Age: 54
End: 2019-09-10
Payer: COMMERCIAL

## 2019-09-10 VITALS
BODY MASS INDEX: 26.82 KG/M2 | DIASTOLIC BLOOD PRESSURE: 82 MMHG | HEART RATE: 94 BPM | HEIGHT: 74 IN | OXYGEN SATURATION: 95 % | TEMPERATURE: 98.2 F | RESPIRATION RATE: 14 BRPM | WEIGHT: 209 LBS | SYSTOLIC BLOOD PRESSURE: 128 MMHG

## 2019-09-10 DIAGNOSIS — J06.9 BACTERIAL URI: ICD-10-CM

## 2019-09-10 DIAGNOSIS — B96.89 BACTERIAL URI: ICD-10-CM

## 2019-09-10 DIAGNOSIS — B35.1 ONYCHOMYCOSIS: ICD-10-CM

## 2019-09-10 DIAGNOSIS — B35.1 ONYCHOMYCOSIS: Primary | ICD-10-CM

## 2019-09-10 PROBLEM — N18.30 CKD (CHRONIC KIDNEY DISEASE) STAGE 3, GFR 30-59 ML/MIN (HCC): Status: ACTIVE | Noted: 2018-10-22

## 2019-09-10 LAB
ALBUMIN SERPL-MCNC: 4.9 G/DL (ref 3.4–5)
ALP BLD-CCNC: 71 U/L (ref 40–129)
ALT SERPL-CCNC: 20 U/L (ref 10–40)
AST SERPL-CCNC: 18 U/L (ref 15–37)
BILIRUB SERPL-MCNC: 0.3 MG/DL (ref 0–1)
BILIRUBIN DIRECT: <0.2 MG/DL (ref 0–0.3)
BILIRUBIN, INDIRECT: NORMAL MG/DL (ref 0–1)
TOTAL PROTEIN: 7 G/DL (ref 6.4–8.2)

## 2019-09-10 PROCEDURE — 99214 OFFICE O/P EST MOD 30 MIN: CPT | Performed by: FAMILY MEDICINE

## 2019-09-10 RX ORDER — AZITHROMYCIN 250 MG/1
250 TABLET, FILM COATED ORAL DAILY
Qty: 1 PACKET | Refills: 0 | Status: SHIPPED | OUTPATIENT
Start: 2019-09-10

## 2019-09-10 RX ORDER — FLUTICASONE PROPIONATE 50 MCG
1 SPRAY, SUSPENSION (ML) NASAL DAILY
Qty: 1 BOTTLE | Refills: 0 | Status: SHIPPED | OUTPATIENT
Start: 2019-09-10 | End: 2019-09-17

## 2019-09-10 RX ORDER — BENZONATATE 100 MG/1
100 CAPSULE ORAL 3 TIMES DAILY PRN
Qty: 30 CAPSULE | Refills: 0 | Status: SHIPPED | OUTPATIENT
Start: 2019-09-10 | End: 2019-09-17

## 2019-09-10 RX ORDER — MECLIZINE HYDROCHLORIDE 25 MG/1
25 TABLET ORAL 3 TIMES DAILY PRN
COMMUNITY

## 2019-09-10 ASSESSMENT — ENCOUNTER SYMPTOMS
RHINORRHEA: 1
ROS SKIN COMMENTS: TOENAIL FUNGUS
ABDOMINAL PAIN: 0
SORE THROAT: 0

## 2019-09-10 ASSESSMENT — PATIENT HEALTH QUESTIONNAIRE - PHQ9
SUM OF ALL RESPONSES TO PHQ9 QUESTIONS 1 & 2: 0
1. LITTLE INTEREST OR PLEASURE IN DOING THINGS: 0
SUM OF ALL RESPONSES TO PHQ QUESTIONS 1-9: 0
2. FEELING DOWN, DEPRESSED OR HOPELESS: 0
SUM OF ALL RESPONSES TO PHQ QUESTIONS 1-9: 0

## 2019-09-10 NOTE — PATIENT INSTRUCTIONS
Use a warm air humidifier in your bedroom at night for 1-2 hours before bed and during the night, as well as Vicks on the chest, back, and under the nose at night.

## 2019-09-10 NOTE — PROGRESS NOTES
rhinorrhea. Negative for ear pain and sore throat. Ear fullness   Gastrointestinal: Negative for abdominal pain. Skin:        Toenail fungus   Allergic/Immunologic: Positive for environmental allergies. /82 (Site: Left Upper Arm)   Pulse 94   Temp 98.2 °F (36.8 °C) (Oral)   Resp 14   Ht 6' 2\" (1.88 m)   Wt 209 lb (94.8 kg)   SpO2 95%   BMI 26.83 kg/m²     Physical Exam   Constitutional: He is oriented to person, place, and time. He appears well-developed and well-nourished. HENT:   Head: Normocephalic and atraumatic. Fluid behind both tympanic membranes, bilateral turbinate edema and erythema, mild pharyngeal erythema     Eyes: Pupils are equal, round, and reactive to light. EOM are normal.   Neck: Normal range of motion. Cardiovascular: Normal rate, regular rhythm and normal heart sounds. Pulmonary/Chest: Effort normal and breath sounds normal.   Abdominal: Bowel sounds are normal. There is no tenderness. Neurological: He is alert and oriented to person, place, and time. Skin:   Yellowed/brittle right great toe with some involvement if remaining right toed, left great toe distal yellowing. Psychiatric: He has a normal mood and affect. His behavior is normal. Judgment and thought content normal.   Vitals reviewed. Plan   Diagnosis Orders   1. Onychomycosis  Hepatic Function Panel   2. Bacterial URI  fluticasone (FLONASE) 50 MCG/ACT nasal spray    benzonatate (TESSALON) 100 MG capsule    azithromycin (ZITHROMAX Z-LIZ) 250 MG tablet     Pt Instructions:  Use a warm air humidifier in your bedroom at night for 1-2 hours before bed and during the night, as well as Vicks on the chest, back, and under the nose at night. Return if symptoms worsen or fail to improve. Prior to Visit Medications    Medication Sig Taking?  Authorizing Provider   fluticasone (FLONASE) 50 MCG/ACT nasal spray 1 spray by Nasal route daily for 7 days Yes Vikash Chu, DO   benzonatate (TESSALON) 100 MG capsule Take 1 capsule by mouth 3 times daily as needed for Cough Yes Estefany Elm, DO   azithromycin (ZITHROMAX Z-LIZ) 250 MG tablet Take 1 tablet by mouth daily As directed on pack Yes Estefany Elm, DO   albuterol sulfate  (90 Base) MCG/ACT inhaler Inhale 2 puffs into the lungs every 6 hours as needed for Wheezing (Prn) Yes Estefany Elm, DO   fluticasone-salmeterol (ADVAIR) 250-50 MCG/DOSE AEPB Inhale 1 puff into the lungs daily Yes Historical Provider, MD   cetirizine (ZYRTEC) 10 MG tablet Take 10 mg by mouth 2 times daily Yes Historical Provider, MD   meclizine (ANTIVERT) 25 MG tablet Take 25 mg by mouth 3 times daily as needed  Historical Provider, MD   triamterene-hydrochlorothiazide (MAXZIDE-25) 37.5-25 MG per tablet Take 1 tablet by mouth daily  Patient not taking: Reported on 9/10/2019  Adenike Posada MD   topiramate (TOPAMAX) 25 MG tablet Take 25 mg by mouth daily  Historical Provider, MD   SUMAtriptan (IMITREX) 25 MG tablet Take 25 mg by mouth once as needed for Migraine (PrN)  Historical Provider, MD   Cholecalciferol (VITAMIN D) 2000 units CAPS capsule Take 2,000 Units by mouth daily  Historical Provider, MD

## 2019-09-29 DIAGNOSIS — B35.1 ONYCHOMYCOSIS: Primary | ICD-10-CM

## 2019-09-29 RX ORDER — TERBINAFINE HYDROCHLORIDE 250 MG/1
250 TABLET ORAL DAILY
Qty: 84 TABLET | Refills: 0 | Status: SHIPPED | OUTPATIENT
Start: 2019-09-29 | End: 2019-12-22

## 2020-02-11 ENCOUNTER — APPOINTMENT (RX ONLY)
Dept: URBAN - METROPOLITAN AREA CLINIC 88 | Facility: CLINIC | Age: 55
Setting detail: DERMATOLOGY
End: 2020-02-11

## 2020-02-11 PROBLEM — D48.5 NEOPLASM OF UNCERTAIN BEHAVIOR OF SKIN: Status: ACTIVE | Noted: 2020-02-11

## 2020-02-11 PROCEDURE — ? BIOPSY BY SHAVE METHOD

## 2020-02-11 PROCEDURE — 11102 TANGNTL BX SKIN SINGLE LES: CPT

## 2020-02-11 NOTE — PROCEDURE: BIOPSY BY SHAVE METHOD
Detail Level: Detailed
Depth Of Biopsy: dermis
Was A Bandage Applied: Yes
Size Of Lesion In Cm: 0.5
X Size Of Lesion In Cm: 0
Additional Anticipated Plans: If malignant consider curettage and destruction
Biopsy Type: H and E
Biopsy Method: Dermablade
Anesthesia Type: 1% lidocaine with epinephrine
Hemostasis: Electrodesiccation
Wound Care: Polysporin ointment
Dressing: bandage
Destruction After The Procedure: No
Type Of Destruction Used: Curettage
Curettage Text: The wound bed was treated with curettage after the biopsy was performed.
Cryotherapy Text: The wound bed was treated with cryotherapy after the biopsy was performed.
Electrodesiccation Text: The wound bed was treated with electrodesiccation after the biopsy was performed.
Electrodesiccation And Curettage Text: The wound bed was treated with electrodesiccation and curettage after the biopsy was performed.
Silver Nitrate Text: The wound bed was treated with silver nitrate after the biopsy was performed.
Lab: [id number]
Consent: Written consent was obtained and risks were reviewed including but not limited to scarring, infection, bleeding, scabbing, incomplete removal, nerve damage and allergy to anesthesia.
Post-Care Instructions: I reviewed with the patient in detail post-care instructions. Patient is to keep the biopsy site dry overnight, and then apply bacitracin twice daily until healed. Patient may apply hydrogen peroxide soaks to remove any crusting.
Notification Instructions: Patient will be notified of biopsy results. However, patient instructed to call the office if not contacted within 2 weeks.
Billing Type: Third-Party Bill